# Patient Record
(demographics unavailable — no encounter records)

---

## 2024-10-30 NOTE — HISTORY OF PRESENT ILLNESS
[FreeTextEntry1] : The patient is a 67-year-old white male with a past medical history remarkable for hypertension and former smoking who presents for evaluation of an abnormal CAT scan of the chest that reported moderate calcification of the left anterior descending coronary artery. Echocardiography demonstrated a normal ejection fraction and only mild valve disease. A coronary CTA was ordered; however it has not been completed.  It is scheduled for November 11. Laboratory results from 8/19/2024 were reviewed.  They will be reevaluated after the results of his coronary CTA are available.  He reports that he is active at work and bikes.

## 2024-10-30 NOTE — CARDIOLOGY SUMMARY
[de-identified] : -Abnormal ECG -Hypertension -ECHOCARDIOGRAM: 10/15/2024, EF 55 to 60%, mild aortic insufficiency, trace mitral regurgitation and tricuspid regurgitation PA systolic 24 2/25/20, EF 55%, mild aortic insufficiency, trace tricuspid regurgitation RVSP 30 -EXERCISE STRESS TEST: 11/28/17, normal

## 2024-10-30 NOTE — DISCUSSION/SUMMARY
[FreeTextEntry1] : Coronary artery calcification on chest CT/abnormal ECG Normal LV function by echocardiography. Coronary CTA to rule out obstructive coronary artery disease in view of the patient's abnormal CAT scan of the chest reporting moderate LAD coronary artery calcification and abnormal ECG  Hypertension Well-controlled on medical therapy  TTM

## 2024-11-14 NOTE — CARDIOLOGY SUMMARY
[de-identified] : CAD: CCTA 11/11/2024, , LM<25%, pLAD, pCX, pRCA: 25-49%, 6mm right lung nodule reported, CT 1 year recommended -Abnormal ECG -Hypertension -ECHOCARDIOGRAM: 10/15/2024, EF 55 to 60%, mild aortic insufficiency, trace mitral regurgitation and tricuspid regurgitation PA systolic 24 2/25/20, EF 55%, mild aortic insufficiency, trace tricuspid regurgitation RVSP 30 -EXERCISE STRESS TEST: 11/28/17, normal

## 2024-11-14 NOTE — DISCUSSION/SUMMARY
[FreeTextEntry1] : Coronary artery disease Nonobstructive Aspirin 81 mg daily prescribed Atorvastatin 20 mg nightly prescribed. Recheck fasting lipids in 2 months.  We rediscussed diet, exercise, and weight control   Pulmonary nodule noted on the patient's CCTA.   Chest CT ordered for 1 year   Hypertension Well-controlled on medical therapy  RTO 6 months

## 2024-11-14 NOTE — HISTORY OF PRESENT ILLNESS
[FreeTextEntry1] : The patient is a 67-year-old white male with a past medical history remarkable for hypertension and former smoking who presented for evaluation of an abnormal CAT scan of the chest that reported moderate calcification of the left anterior descending coronary artery. Echocardiography demonstrated a normal ejection fraction and only mild valve disease. A coronary CTA was performed on 11/11/2024.  This demonstrated a coronary artery calcium score of 349 and nonobstructive disease consisting of a left main obstruction of less than 25% and 25 to 49% obstructions of the proximal LAD, proximal circumflex, and proximal RCA. Laboratories from 8/2024 demonstrated an LDL of 117. The patient does not exercise but is active at work.  He is chest pain and dyspnea free. I reviewed the results with the patient.  A plan was developed.  His questions were answered.  We spoke on the phone for 5 minutes.  He consented to a telephone telemedicine visit

## 2025-01-03 NOTE — CONSULT LETTER
[Dear  ___] : Dear  [unfilled], [Consult Letter:] : I had the pleasure of evaluating your patient, [unfilled]. [Please see my note below.] : Please see my note below. [Consult Closing:] : Thank you very much for allowing me to participate in the care of this patient.  If you have any questions, please do not hesitate to contact me. [Sincerely,] : Sincerely, [FreeTextEntry3] : Aris Gardner MD Chief of Joint Replacement Primary & Revision Hip and Knee Replacement  [facility]

## 2025-01-03 NOTE — DISCUSSION/SUMMARY
[de-identified] :  SERGE RUIZ is a 67 year old male who presents with moderate patellofemoral compartment arthritis.  Nonoperative treatment options for knee arthritis were discussed including anti-inflammatories, physical therapy, intraarticular cortisone injections, and hyaluronic acid gel injections.  A course of Mobic was recommended. The patient was given a prescription for the Mobic with directions. He was instructed to stop the medicine and call the office if there are any adverse reaction to the medicine.

## 2025-01-03 NOTE — PHYSICAL EXAM
[de-identified] :  The patient appears well nourished and in no apparent distress. The patient is alert and oriented to person, place, and time. Affect and mood appear normal. The head is normocephalic and atraumatic. The eyes reveal normal sclera and extra ocular muscles are intact. The tongue is midline with no apparent lesions. Skin shows normal turgor with no evidence of eczema or psoriasis. No respiratory distress noted. Sensation grossly intact. [de-identified] :  Exam of the right knee shows -3 to 135 degrees of flexion measured with a goniometer.  There is an effusion.  There is patellofemoral crepitance.  5/5 motor strength bilaterally distally. Sensation intact distally. [de-identified] : X-ray: 4 views of the right knee demonstrate moderate patellofemoral compartment arthritis

## 2025-01-03 NOTE — HISTORY OF PRESENT ILLNESS
[de-identified] : Mr. SERGE RUIZ is a 67 year old male presenting for months of intermittent right knee pain. His knee pain is localized to the anterior aspect of the knee and around the knee cap. Pain is worse with stairs and days of increased physical activity.  Patient has tried PT and NSAIDs in the past. He has not had injections. No prior knee surgery.